# Patient Record
Sex: FEMALE | URBAN - METROPOLITAN AREA
[De-identification: names, ages, dates, MRNs, and addresses within clinical notes are randomized per-mention and may not be internally consistent; named-entity substitution may affect disease eponyms.]

---

## 2022-08-01 ENCOUNTER — APPOINTMENT (RX ONLY)
Dept: URBAN - METROPOLITAN AREA CLINIC 156 | Facility: CLINIC | Age: 27
Setting detail: DERMATOLOGY
End: 2022-08-01

## 2022-08-01 VITALS — WEIGHT: 200 LBS | HEIGHT: 64 IN

## 2022-08-01 DIAGNOSIS — L24 IRRITANT CONTACT DERMATITIS: ICD-10-CM

## 2022-08-01 DIAGNOSIS — L57.8 OTHER SKIN CHANGES DUE TO CHRONIC EXPOSURE TO NONIONIZING RADIATION: ICD-10-CM

## 2022-08-01 PROBLEM — L30.9 DERMATITIS, UNSPECIFIED: Status: ACTIVE | Noted: 2022-08-01

## 2022-08-01 PROCEDURE — ? PRESCRIPTION

## 2022-08-01 PROCEDURE — ? BIOPSY BY SHAVE METHOD

## 2022-08-01 PROCEDURE — 99203 OFFICE O/P NEW LOW 30 MIN: CPT | Mod: 25

## 2022-08-01 PROCEDURE — 11102 TANGNTL BX SKIN SINGLE LES: CPT

## 2022-08-01 PROCEDURE — ? COUNSELING

## 2022-08-01 RX ORDER — NYSTATIN AND TRIAMCINOLONE ACETONIDE 100000; 1 [USP'U]/G; MG/G
CREAM TOPICAL
Qty: 60 | Refills: 0 | Status: ERX | COMMUNITY
Start: 2022-08-01

## 2022-08-01 RX ADMIN — NYSTATIN AND TRIAMCINOLONE ACETONIDE: 100000; 1 CREAM TOPICAL at 00:00

## 2022-08-01 ASSESSMENT — LOCATION SIMPLE DESCRIPTION DERM: LOCATION SIMPLE: RIGHT FOREARM

## 2022-08-01 ASSESSMENT — LOCATION DETAILED DESCRIPTION DERM: LOCATION DETAILED: RIGHT VENTRAL DISTAL FOREARM

## 2022-08-01 ASSESSMENT — LOCATION ZONE DERM: LOCATION ZONE: ARM

## 2022-08-01 NOTE — PROCEDURE: MIPS QUALITY
Detail Level: Detailed
Quality 226: Preventive Care And Screening: Tobacco Use: Screening And Cessation Intervention: Patient screened for tobacco use and is an ex/non-smoker
Quality 111:Pneumonia Vaccination Status For Older Adults: Pneumococcal vaccine administered on or after patient’s 60th birthday and before the end of the measurement period
Quality 431: Preventive Care And Screening: Unhealthy Alcohol Use - Screening: Patient not identified as an unhealthy alcohol user when screened for unhealthy alcohol use using a systematic screening method
Quality 110: Preventive Care And Screening: Influenza Immunization: Influenza Immunization not Administered because Patient Refused.

## 2022-08-29 ENCOUNTER — APPOINTMENT (RX ONLY)
Dept: URBAN - METROPOLITAN AREA CLINIC 157 | Facility: CLINIC | Age: 27
Setting detail: DERMATOLOGY
End: 2022-08-29

## 2022-08-29 VITALS — WEIGHT: 200 LBS | HEIGHT: 64 IN

## 2022-08-29 DIAGNOSIS — L30.0 NUMMULAR DERMATITIS: ICD-10-CM | Status: IMPROVED

## 2022-08-29 PROCEDURE — ? TREATMENT REGIMEN

## 2022-08-29 PROCEDURE — ? COUNSELING

## 2022-08-29 PROCEDURE — ? PRESCRIPTION

## 2022-08-29 PROCEDURE — 99213 OFFICE O/P EST LOW 20 MIN: CPT

## 2022-08-29 RX ORDER — BETAMETHASONE DIPROPIONATE 0.5 MG/G
CREAM TOPICAL
Qty: 45 | Refills: 2 | Status: ERX | COMMUNITY
Start: 2022-08-29

## 2022-08-29 RX ADMIN — BETAMETHASONE DIPROPIONATE: 0.5 CREAM TOPICAL at 00:00

## 2022-08-29 ASSESSMENT — LOCATION ZONE DERM: LOCATION ZONE: ARM

## 2022-08-29 ASSESSMENT — LOCATION SIMPLE DESCRIPTION DERM: LOCATION SIMPLE: RIGHT FOREARM

## 2022-08-29 ASSESSMENT — SEVERITY ASSESSMENT: SEVERITY: ALMOST CLEAR

## 2022-08-29 ASSESSMENT — LOCATION DETAILED DESCRIPTION DERM: LOCATION DETAILED: RIGHT PROXIMAL DORSAL FOREARM

## 2022-08-29 NOTE — PROCEDURE: TREATMENT REGIMEN
Detail Level: Zone
Initiate Treatment: betamethasone dipropionate 0.05 % topical cream \\nApply by topical route to the affected areas on the arm BID for 2 weeks then as needed for flares.

## 2023-07-11 ENCOUNTER — OFFICE VISIT (OUTPATIENT)
Dept: OBSTETRICS AND GYNECOLOGY | Facility: CLINIC | Age: 28
End: 2023-07-11
Payer: COMMERCIAL

## 2023-07-11 ENCOUNTER — LAB VISIT (OUTPATIENT)
Dept: LAB | Facility: HOSPITAL | Age: 28
End: 2023-07-11
Attending: STUDENT IN AN ORGANIZED HEALTH CARE EDUCATION/TRAINING PROGRAM
Payer: COMMERCIAL

## 2023-07-11 ENCOUNTER — HOSPITAL ENCOUNTER (OUTPATIENT)
Dept: RADIOLOGY | Facility: HOSPITAL | Age: 28
Discharge: HOME | End: 2023-07-11
Attending: STUDENT IN AN ORGANIZED HEALTH CARE EDUCATION/TRAINING PROGRAM
Payer: COMMERCIAL

## 2023-07-11 VITALS
HEART RATE: 69 BPM | HEIGHT: 63 IN | BODY MASS INDEX: 23.79 KG/M2 | SYSTOLIC BLOOD PRESSURE: 121 MMHG | DIASTOLIC BLOOD PRESSURE: 62 MMHG | WEIGHT: 134.25 LBS

## 2023-07-11 DIAGNOSIS — N92.6 IRREGULAR MENSES: ICD-10-CM

## 2023-07-11 DIAGNOSIS — Z01.419 WELL WOMAN EXAM WITH ROUTINE GYNECOLOGICAL EXAM: Primary | ICD-10-CM

## 2023-07-11 DIAGNOSIS — Z71.85 HPV VACCINE COUNSELING: ICD-10-CM

## 2023-07-11 DIAGNOSIS — Z01.419 WELL WOMAN EXAM WITH ROUTINE GYNECOLOGICAL EXAM: ICD-10-CM

## 2023-07-11 DIAGNOSIS — Z13.31 POSITIVE DEPRESSION SCREENING: ICD-10-CM

## 2023-07-11 LAB
ALBUMIN SERPL BCP-MCNC: 4.6 G/DL (ref 3.5–5.2)
ALP SERPL-CCNC: 53 U/L (ref 55–135)
ALT SERPL W/O P-5'-P-CCNC: 14 U/L (ref 10–44)
ANION GAP SERPL CALC-SCNC: 13 MMOL/L (ref 8–16)
AST SERPL-CCNC: 20 U/L (ref 10–40)
BASOPHILS # BLD AUTO: 0.05 K/UL (ref 0–0.2)
BASOPHILS NFR BLD: 0.6 % (ref 0–1.9)
BILIRUB SERPL-MCNC: 0.4 MG/DL (ref 0.1–1)
BUN SERPL-MCNC: 13 MG/DL (ref 6–20)
CALCIUM SERPL-MCNC: 9.6 MG/DL (ref 8.7–10.5)
CHLORIDE SERPL-SCNC: 107 MMOL/L (ref 95–110)
CHOLEST SERPL-MCNC: 237 MG/DL (ref 120–199)
CHOLEST/HDLC SERPL: 3.3 {RATIO} (ref 2–5)
CO2 SERPL-SCNC: 21 MMOL/L (ref 23–29)
CREAT SERPL-MCNC: 0.8 MG/DL (ref 0.5–1.4)
DIFFERENTIAL METHOD: ABNORMAL
EOSINOPHIL # BLD AUTO: 0.1 K/UL (ref 0–0.5)
EOSINOPHIL NFR BLD: 1.3 % (ref 0–8)
ERYTHROCYTE [DISTWIDTH] IN BLOOD BY AUTOMATED COUNT: 12 % (ref 11.5–14.5)
EST. GFR  (NO RACE VARIABLE): >60 ML/MIN/1.73 M^2
GLUCOSE SERPL-MCNC: 81 MG/DL (ref 70–110)
HCT VFR BLD AUTO: 41.5 % (ref 37–48.5)
HDLC SERPL-MCNC: 72 MG/DL (ref 40–75)
HDLC SERPL: 30.4 % (ref 20–50)
HGB BLD-MCNC: 14.2 G/DL (ref 12–16)
IMM GRANULOCYTES # BLD AUTO: 0.02 K/UL (ref 0–0.04)
IMM GRANULOCYTES NFR BLD AUTO: 0.2 % (ref 0–0.5)
LDLC SERPL CALC-MCNC: 153.4 MG/DL (ref 63–159)
LYMPHOCYTES # BLD AUTO: 2.8 K/UL (ref 1–4.8)
LYMPHOCYTES NFR BLD: 32.3 % (ref 18–48)
MCH RBC QN AUTO: 31.1 PG (ref 27–31)
MCHC RBC AUTO-ENTMCNC: 34.2 G/DL (ref 32–36)
MCV RBC AUTO: 91 FL (ref 82–98)
MONOCYTES # BLD AUTO: 0.4 K/UL (ref 0.3–1)
MONOCYTES NFR BLD: 4.9 % (ref 4–15)
NEUTROPHILS # BLD AUTO: 5.3 K/UL (ref 1.8–7.7)
NEUTROPHILS NFR BLD: 60.7 % (ref 38–73)
NONHDLC SERPL-MCNC: 165 MG/DL
NRBC BLD-RTO: 0 /100 WBC
PLATELET # BLD AUTO: 316 K/UL (ref 150–450)
PMV BLD AUTO: 10.7 FL (ref 9.2–12.9)
POTASSIUM SERPL-SCNC: 3.7 MMOL/L (ref 3.5–5.1)
PROT SERPL-MCNC: 7.9 G/DL (ref 6–8.4)
RBC # BLD AUTO: 4.57 M/UL (ref 4–5.4)
SODIUM SERPL-SCNC: 141 MMOL/L (ref 136–145)
TRIGL SERPL-MCNC: 58 MG/DL (ref 30–150)
WBC # BLD AUTO: 8.79 K/UL (ref 3.9–12.7)

## 2023-07-11 PROCEDURE — 83002 ASSAY OF GONADOTROPIN (LH): CPT | Performed by: STUDENT IN AN ORGANIZED HEALTH CARE EDUCATION/TRAINING PROGRAM

## 2023-07-11 PROCEDURE — 99385 PREV VISIT NEW AGE 18-39: CPT | Mod: S$GLB,,, | Performed by: STUDENT IN AN ORGANIZED HEALTH CARE EDUCATION/TRAINING PROGRAM

## 2023-07-11 PROCEDURE — 83525 ASSAY OF INSULIN: CPT | Performed by: STUDENT IN AN ORGANIZED HEALTH CARE EDUCATION/TRAINING PROGRAM

## 2023-07-11 PROCEDURE — 80053 COMPREHEN METABOLIC PANEL: CPT | Performed by: STUDENT IN AN ORGANIZED HEALTH CARE EDUCATION/TRAINING PROGRAM

## 2023-07-11 PROCEDURE — 83001 ASSAY OF GONADOTROPIN (FSH): CPT | Performed by: STUDENT IN AN ORGANIZED HEALTH CARE EDUCATION/TRAINING PROGRAM

## 2023-07-11 PROCEDURE — 76856 US EXAM PELVIC COMPLETE: CPT | Mod: TC,PO

## 2023-07-11 PROCEDURE — 84403 ASSAY OF TOTAL TESTOSTERONE: CPT | Performed by: STUDENT IN AN ORGANIZED HEALTH CARE EDUCATION/TRAINING PROGRAM

## 2023-07-11 PROCEDURE — 76830 US PELVIS COMP WITH TRANSVAG NON-OB (XPD): ICD-10-PCS | Mod: 26,,, | Performed by: RADIOLOGY

## 2023-07-11 PROCEDURE — 84270 ASSAY OF SEX HORMONE GLOBUL: CPT | Performed by: STUDENT IN AN ORGANIZED HEALTH CARE EDUCATION/TRAINING PROGRAM

## 2023-07-11 PROCEDURE — 82627 DEHYDROEPIANDROSTERONE: CPT | Performed by: STUDENT IN AN ORGANIZED HEALTH CARE EDUCATION/TRAINING PROGRAM

## 2023-07-11 PROCEDURE — 76856 US PELVIS COMP WITH TRANSVAG NON-OB (XPD): ICD-10-PCS | Mod: 26,,, | Performed by: RADIOLOGY

## 2023-07-11 PROCEDURE — 85025 COMPLETE CBC W/AUTO DIFF WBC: CPT | Performed by: STUDENT IN AN ORGANIZED HEALTH CARE EDUCATION/TRAINING PROGRAM

## 2023-07-11 PROCEDURE — 76856 US EXAM PELVIC COMPLETE: CPT | Mod: 26,,, | Performed by: RADIOLOGY

## 2023-07-11 PROCEDURE — 99999 PR PBB SHADOW E&M-NEW PATIENT-LVL III: CPT | Mod: PBBFAC,,, | Performed by: STUDENT IN AN ORGANIZED HEALTH CARE EDUCATION/TRAINING PROGRAM

## 2023-07-11 PROCEDURE — 83036 HEMOGLOBIN GLYCOSYLATED A1C: CPT | Performed by: STUDENT IN AN ORGANIZED HEALTH CARE EDUCATION/TRAINING PROGRAM

## 2023-07-11 PROCEDURE — 76830 TRANSVAGINAL US NON-OB: CPT | Mod: 26,,, | Performed by: RADIOLOGY

## 2023-07-11 PROCEDURE — 99385 PR PREVENTIVE VISIT,NEW,18-39: ICD-10-PCS | Mod: S$GLB,,, | Performed by: STUDENT IN AN ORGANIZED HEALTH CARE EDUCATION/TRAINING PROGRAM

## 2023-07-11 PROCEDURE — 80061 LIPID PANEL: CPT | Performed by: STUDENT IN AN ORGANIZED HEALTH CARE EDUCATION/TRAINING PROGRAM

## 2023-07-11 PROCEDURE — 99999 PR PBB SHADOW E&M-NEW PATIENT-LVL III: ICD-10-PCS | Mod: PBBFAC,,, | Performed by: STUDENT IN AN ORGANIZED HEALTH CARE EDUCATION/TRAINING PROGRAM

## 2023-07-11 PROCEDURE — 36415 COLL VENOUS BLD VENIPUNCTURE: CPT | Performed by: STUDENT IN AN ORGANIZED HEALTH CARE EDUCATION/TRAINING PROGRAM

## 2023-07-11 NOTE — PROGRESS NOTES
[facility] Note    Subjective:     Chief Complaint: Annual Exam      HPI:  2023    27-year-old female presents as a new patient interested in getting tested for PCOS.  She was previously on birth control pills but stopped these in February.  She recently graduated from [employer] in May.  Since stopping the pills, she reports anxiety, depression, weight gain, hair loss and irregular cycles.  Cycles were previously irregular prior to starting birth control pills.  The pills made cycles more regular.  She also reports more cold sores.  She does report an aunt had PCOS.  Patient does report excessive hair growth.  She and her fiance moved from [place].  Denies any family history of thyroid issues.    She had her well-woman exam in 2022.  That Pap smear was normal.  Denies any history of abnormal Pap smears.      GYN & OB History  Patient's last menstrual period was 2023 (exact date).     Date of Last Pap: No result found    OB History    Para Term  AB Living   0 0 0 0 0 0   SAB IAB Ectopic Multiple Live Births   0 0 0 0 0       History reviewed. No pertinent past medical history.  Past Surgical History:   Procedure Laterality Date    CHOLECYSTECTOMY       Review of patient's allergies indicates:   Allergen Reactions    Wheat containing prod Swelling    Amoxicillin Hives and Rash       Social History     Socioeconomic History    Marital status: Single   Tobacco Use    Smoking status: Never    Smokeless tobacco: Never   Substance and Sexual Activity    Alcohol use: Yes     Comment: rarely    Drug use: Never    Sexual activity: Yes     Partners: Male     Birth control/protection: None       Family History   Problem Relation Age of Onset    Diabetes Maternal Grandfather        Medications  No current outpatient medications on file prior to visit.       Review of Systems   Constitutional: Negative for appetite change, fever and unexpected weight  "change.   Respiratory: Negative for cough and shortness of breath.    Cardiovascular: Negative for chest pain and palpitations.   Gastrointestinal: Negative for abdominal distention, constipation, nausea and vomiting.   Genitourinary: Negative for dyspareunia, dysuria, hematuria and pelvic pain.        GYN ROS per HPI.   Musculoskeletal: Negative for gait problem and myalgias.   Skin: Negative for rash.   Neurological: Negative for dizziness, light-headedness and headaches.   Psychiatric/Behavioral: The patient is not nervous/anxious.      Objective:     /62 (BP Location: Left arm, Patient Position: Sitting, BP Method: Medium (Automatic))   Pulse 69   Ht 5' 3" (1.6 m)   Wt 60.9 kg (134 lb 4.2 oz)   LMP 07/05/2023 (Exact Date)   BMI 23.78 kg/m²     Physical Exam  Exam conducted with a chaperone present.   Constitutional:       General: She is not in acute distress.  HENT:      Head: Normocephalic.   Eyes:      General: No scleral icterus.  Cardiovascular:      Rate and Rhythm: Normal rate and regular rhythm.   Genitourinary:     General: Normal vulva.      Pubic Area: No rash.       Labia:         Right: No rash or tenderness.         Left: No rash or tenderness.       Vagina: No tenderness or bleeding.      Cervix: No cervical motion tenderness.      Uterus: Not tender.       Adnexa:         Right: No mass or tenderness.          Left: No mass or tenderness.     Neurological:      General: No focal deficit present.      Mental Status: She is alert.   Psychiatric:         Mood and Affect: Mood normal.       Assessment:     1. Well woman exam with routine gynecological exam    2. Irregular menses    3. Positive depression screening      Plan:     1. Well woman exam with routine gynecological exam  - Comprehensive Metabolic Panel; Future  - CBC Auto Differential; Future  - Lipid Panel; Future    2. Irregular menses  - TSH; Future  - T4, FREE; Future  - TSH  - T4, FREE  - US Pelvis Comp with Transvag NON-OB " (xpd; Future  - Luteinizing Hormone; Future  - Follicle Stimulating Hormone; Future  - Testosterone Panel; Future  - Hemoglobin A1C; Future  - DHEA-Sulfate; Future  - Insulin, Random; Future    3. Positive depression screening  - Managed by a therapist. Denies any suicidal or homicidal intent. If symptoms worsen, she was instructed to contact her therapist.       Follow up for ASAP after recommended testing is obtained.  Follow-up sooner if needed.   Patient will be notified when labs return and further recommendations will be given at that time.     Based on history, I do suspect that she might have PCOS. We will get labs and pelvic ultrasound to evaluate.   She will follow up to discuss the results.     She does not have a PCP, and she declined a referral to family medicine since she anticipates moving soon. Her fiance is a .     The patient was provided with GARDASIL 9 vaccine information.  The pros, cons, risks, benefits, alternatives and indications of the GARDASIL vaccine were discussed.  We discussed the 0, 2 months, and 6 months dosing.     She has already received the GARDASIL / Human papilloma virus vaccine and therefore does not require it.         Due for annual exam in October.    Paula Arrington PA-C  07/11/2023

## 2023-07-12 ENCOUNTER — PATIENT MESSAGE (OUTPATIENT)
Dept: OBSTETRICS AND GYNECOLOGY | Facility: CLINIC | Age: 28
End: 2023-07-12
Payer: COMMERCIAL

## 2023-07-12 LAB
ESTIMATED AVG GLUCOSE: 103 MG/DL (ref 68–131)
HBA1C MFR BLD: 5.2 % (ref 4.5–6.2)

## 2023-07-13 LAB
DHEA-S SERPL-MCNC: 238 MCG/DL (ref 83–377)
FSH SERPL-ACNC: 5.8 IU/L
INSULIN AB SER QL: 4.4 MCIU/ML (ref 2.6–24.9)
LH SERPL-ACNC: 9.2 IU/L

## 2023-07-18 LAB
ALBUMIN SERPL-MCNC: 4.8 G/DL (ref 3.6–5.1)
SHBG SERPL-SCNC: 96 NMOL/L (ref 17–124)
TESTOST FREE SERPL-MCNC: 2.7 PG/ML (ref 0.2–5)
TESTOST SERPL-MCNC: 58 NG/DL (ref 2–45)
TESTOSTERONE.FREE+WB SERPL-MCNC: 6 NG/DL (ref 0.5–8.5)

## 2023-07-19 NOTE — PROGRESS NOTES
[facility] Note    Subjective:     Chief Complaint: Follow-up (2 week f/u)      HPI:  2023    27-year-old female presents as a new patient interested in getting tested for PCOS.  She was previously on birth control pills but stopped these in February.  She recently graduated from [employer] in May.  Since stopping the pills, she reports anxiety, depression, weight gain, hair loss and irregular cycles.  Cycles were previously irregular prior to starting birth control pills.  The pills made cycles more regular.  She also reports more cold sores.  She does report an aunt had PCOS.  Patient does report excessive hair growth.  She and her fiance moved from [place].  Denies any family history of thyroid issues.    She had her well-woman exam in 2022.  That Pap smear was normal.  Denies any history of abnormal Pap smears.    2023    27-YO female presents as a follow up and was last seen for the above.  She wanted testing for PCOS and presents today to discuss labs and ultrasound results.    She also reports that her anxiety has worsened since stopping the pills. She stopped pills because she heard that estrogen is not good for her gut health and that she developed yeast infections while on OCP. Denies any suicidal or homicidal ideation or fidel. She tried Lexapro (escitalopram) but reports that it did not help her anxiety and caused insomnia. She was on OCP and Lexapro at that same time and did not have any reactions. She denies any history of blood clots, strokes or migraines with aura.     GYN & OB History  Patient's last menstrual period was 2023 (exact date).     Date of Last Pap: No result found    OB History    Para Term  AB Living   0 0 0 0 0 0   SAB IAB Ectopic Multiple Live Births   0 0 0 0 0       History reviewed. No pertinent past medical history.  Past Surgical History:   Procedure Laterality Date    CHOLECYSTECTOMY    "    Review of patient's allergies indicates:   Allergen Reactions    Wheat containing prod Swelling    Amoxicillin Hives and Rash       Social History     Socioeconomic History    Marital status: Single   Tobacco Use    Smoking status: Never    Smokeless tobacco: Never   Substance and Sexual Activity    Alcohol use: Yes     Comment: rarely    Drug use: Never    Sexual activity: Yes     Partners: Male     Birth control/protection: None       Family History   Problem Relation Age of Onset    Diabetes Maternal Grandfather        Medications  Current Outpatient Medications on File Prior to Visit   Medication Sig Dispense Refill Last Dose    valACYclovir (VALTREX) 1000 MG tablet    Taking       Review of Systems   Constitutional: Negative for appetite change, fever and unexpected weight change.   Respiratory: Negative for cough and shortness of breath.    Cardiovascular: Negative for chest pain and palpitations.   Gastrointestinal: Negative for abdominal distention, constipation, nausea and vomiting.   Genitourinary: Negative for dyspareunia, dysuria, hematuria and pelvic pain.        GYN ROS per HPI.   Musculoskeletal: Negative for gait problem and myalgias.   Skin: Negative for rash.   Neurological: Negative for dizziness, light-headedness and headaches.   Psychiatric/Behavioral: The patient is not nervous/anxious.      Objective:     /68 (BP Location: Left arm, Patient Position: Sitting, BP Method: Medium (Automatic))   Pulse 65   Ht 5' 3" (1.6 m)   Wt 68.2 kg (150 lb 5.7 oz)   LMP 07/05/2023 (Exact Date)   BMI 26.63 kg/m²     Physical Exam  Constitutional:       General: She is not in acute distress.  HENT:      Head: Normocephalic.   Pulmonary:      Effort: Pulmonary effort is normal. No respiratory distress.   Neurological:      General: No focal deficit present.      Mental Status: She is alert.   Psychiatric:         Mood and Affect: Mood normal.         Behavior: Behavior normal.     Recent lab " results:    Follicle Stimulating Hormone   Date Value Ref Range Status   07/11/2023 5.8 IU/L Final     Comment:     -------------------REFERENCE VALUE--------------------------  Premenopausal:  2.9-14.6 IU/L (Follicular)  4.7-23.2 IU/L (Midcycle)  1.4-8.9 IU/L (Luteal)  Postmenopausal:  16.0-157.0 IU/L    Test Performed by:  [place] [address]  : Nato Carrillo M.D. Ph.D.; CLIA# [id number]       LH   Date Value Ref Range Status   07/11/2023 9.2 IU/L Final     Comment:     -------------------REFERENCE VALUE--------------------------  Premenopausal:  1.9-14.6 IU/L (Follicular)  12.2-118.0 IU/L (Midcycle)  0.7-12.9 IU/L (Luteal)  Postmenopausal:  5.3-65.4 IU/L    Test Performed by:  [place] [address]  : Nato Carrillo M.D. Ph.D.; CLIA# [id number]       Testosterone, Free   Date Value Ref Range Status   07/11/2023 2.7 0.2 - 5.0 pg/mL Final     Testosterone   Date Value Ref Range Status   07/11/2023 58 (H) 2 - 45 ng/dL Final     Comment:     For additional information, please refer to   http://[email or web address].Hyperpot/faq/TotalTestosteroneLCMSMS  (This link is being provided for informational/  educational purposes only.)    This test was developed and its analytical performance   characteristics have been determined by [facility]. It has not been cleared or approved by the  FDA. This assay has been validated pursuant to the CLIA   regulations and is used for clinical purposes.    TEST PERFORMED AT:  [facility] [address] [address]  RADHA DAMON MD       DHEA-SO4   Date Value Ref Range Status   07/11/2023 238 83 - 377 mcg/dL Final     Comment:     Test Performed by:  [facility] - [address] [address]  : Nato  ZOË Carrillo M.D. Ph.D.; CLIA# [id number]       Insulin   Date Value Ref Range Status   07/11/2023 4.4 2.6 - 24.9 mcIU/mL Final     Comment:     Test Performed by:  [facility]  [address] [place] [address]  : Nato Carrillo M.D. Ph.D.; CLIA# [id number]         Recent imaging results:    Results for orders placed during the hospital encounter of 07/11/23    US Pelvis Comp with Transvag NON-OB (xpd    Narrative  EXAMINATION:  US PELVIS COMP WITH TRANSVAG NON-OB (XPD)    CLINICAL HISTORY:  Irregular menstruation, unspecified    TECHNIQUE:  Transabdominal sonography of the pelvis was performed, followed by transvaginal sonography to better evaluate the uterus and ovaries.    COMPARISON:  None.    FINDINGS:  Uterus:    Size: 7.4 x 3.4 x 3.8 cm    Masses: None small nabothian cysts noted    Endometrium: Normal in this pre menopausal patient, measuring 5 mm.    Right ovary:    Size: 3 x 2.3 x 4.6 cm volume 16.2 cc.    Appearance: There is a 1 cm cyst, and there are numerous 2-8 mm cyst.    Vascular flow: Normal.    Left ovary:    Size: 3.7 x 3.4 x 2.4 cm volume 16 cc    Appearance: Normal numerous 2-8 mm cyst    Vascular Flow: Normal.    Free Fluid:    None.    Impression  Findings suggesting polycystic ovary syndrome      Electronically signed by: Randi Peña MD  Date:    07/11/2023  Time:    16:06      Assessment:     1. PCOS (polycystic ovarian syndrome)    2. Anxiety    3. History of candidal vulvovaginitis      Plan:     1. PCOS (polycystic ovarian syndrome)  - norethindrone-ethinyl estradiol (MICROGESTIN 1/20) 1-20 mg-mcg per tablet; Take 1 tablet by mouth once daily.  Dispense: 90 tablet; Refill: 3    2. Anxiety  - sertraline (ZOLOFT) 25 MG tablet; Take 1 tablet (25 mg total) by mouth once daily.  Dispense: 90 tablet; Refill: 0    3. History of candidal vulvovaginitis  - fluconazole (DIFLUCAN) 150 MG Tab; Take 1 tablet (150 mg total) by mouth  once daily.  Dispense: 1 tablet; Refill: 2      Follow up in about 3 months (around 10/25/2023).  Follow-up sooner if needed.     Thyroid labs were not drawn. At a later date, she might go get them drawn since she was interested in getting those levels. We do not expect this lab to influence our treatment at this time assuming labs are normal. Denies any thyroid issues. Lab orders are in the system.    She meets the criteria of PCOS (oligo ovulation/anovulation and clinical/biochemical signs of hyperandrogenism and polycystic ovaries).  We discussed her lab and imaging results as listed above.   Discussed treatment options which included OCP, birth control patches, vaginal ring, progestin only pills, subdermal progesterone implant, progesterone injection and progesterone only IUDs.     She elects to start OCP.     Discussed PCOS and its effects on the body.     The pros, cons, risks, benefits, alternatives and indications of the medication(s) prescribed, as well as appropriate use discussed.  She was instructed to start the OCP on Sunday.      - Discussed polycystic ovarian syndrome (PCOS) is a disorder characterized by hyperandrogenism, ovulatory dysfunction, and polycystic appearing ovaries. Discussed increased risk for metabolic syndrome, risk of DM and cardiovascular disease.   - Discussed weight loss can help lowering circulating levels of androgen and help spontaneous ovulation/resumption of menses. Even a 5% reduction in weight has been shown to help. Intermittent fasting proving to be good at lowering insulin levels. Discussed lifestyle modification including diet and exercise.   - Discussed metformin if studies showing insulin resistance. Discussed certain medications can help with hirsutism if indicated for patient.   - Discussed treatment depends on end goal including regular menstrual cycles vs fertility help. Usually OCPs are a first line therapy to maintain level of shedding lining of the uterus. Can  also do cyclic progesterone as to not interfere with pregnancy. If trying to get pregnant may require OI or MAXIMINO help.   - Discussed need for regular cycles as PCOS can increase EIN or endometrial cancer.     We will also start medication for anxiety. If symptoms worsen or she develops any suicidal or homicidal ideation, she was instructed to stop the medication immediately and contact the clinic.   Sent a dose of diflucan if she develops yeast infections on OCP.        Due for annual exam in October.    Paula Arrington PA-C  07/25/2023

## 2023-07-25 ENCOUNTER — OFFICE VISIT (OUTPATIENT)
Dept: OBSTETRICS AND GYNECOLOGY | Facility: CLINIC | Age: 28
End: 2023-07-25
Payer: COMMERCIAL

## 2023-07-25 ENCOUNTER — TELEPHONE (OUTPATIENT)
Dept: OBSTETRICS AND GYNECOLOGY | Facility: CLINIC | Age: 28
End: 2023-07-25

## 2023-07-25 VITALS
BODY MASS INDEX: 26.64 KG/M2 | WEIGHT: 150.38 LBS | HEART RATE: 65 BPM | SYSTOLIC BLOOD PRESSURE: 112 MMHG | DIASTOLIC BLOOD PRESSURE: 68 MMHG | HEIGHT: 63 IN

## 2023-07-25 DIAGNOSIS — F41.9 ANXIETY: ICD-10-CM

## 2023-07-25 DIAGNOSIS — E28.2 PCOS (POLYCYSTIC OVARIAN SYNDROME): Primary | ICD-10-CM

## 2023-07-25 DIAGNOSIS — Z86.19 HISTORY OF CANDIDAL VULVOVAGINITIS: ICD-10-CM

## 2023-07-25 PROCEDURE — 99213 OFFICE O/P EST LOW 20 MIN: CPT | Mod: S$GLB,,, | Performed by: STUDENT IN AN ORGANIZED HEALTH CARE EDUCATION/TRAINING PROGRAM

## 2023-07-25 PROCEDURE — 99999 PR PBB SHADOW E&M-EST. PATIENT-LVL III: CPT | Mod: PBBFAC,,, | Performed by: STUDENT IN AN ORGANIZED HEALTH CARE EDUCATION/TRAINING PROGRAM

## 2023-07-25 PROCEDURE — 99999 PR PBB SHADOW E&M-EST. PATIENT-LVL III: ICD-10-PCS | Mod: PBBFAC,,, | Performed by: STUDENT IN AN ORGANIZED HEALTH CARE EDUCATION/TRAINING PROGRAM

## 2023-07-25 PROCEDURE — 99213 PR OFFICE/OUTPT VISIT, EST, LEVL III, 20-29 MIN: ICD-10-PCS | Mod: S$GLB,,, | Performed by: STUDENT IN AN ORGANIZED HEALTH CARE EDUCATION/TRAINING PROGRAM

## 2023-07-25 RX ORDER — VALACYCLOVIR HYDROCHLORIDE 1 G/1
TABLET, FILM COATED ORAL
COMMUNITY
Start: 2023-06-10

## 2023-07-25 RX ORDER — FLUCONAZOLE 150 MG/1
150 TABLET ORAL DAILY
Qty: 1 TABLET | Refills: 2 | Status: SHIPPED | OUTPATIENT
Start: 2023-07-25

## 2023-07-25 RX ORDER — NORETHINDRONE ACETATE AND ETHINYL ESTRADIOL .02; 1 MG/1; MG/1
1 TABLET ORAL DAILY
Qty: 90 TABLET | Refills: 3 | Status: SHIPPED | OUTPATIENT
Start: 2023-07-25 | End: 2024-07-24

## 2023-07-25 RX ORDER — SERTRALINE HYDROCHLORIDE 25 MG/1
25 TABLET, FILM COATED ORAL DAILY
Qty: 90 TABLET | Refills: 0 | Status: SHIPPED | OUTPATIENT
Start: 2023-07-25 | End: 2023-10-23

## 2023-07-25 NOTE — TELEPHONE ENCOUNTER
Contacted pharmacy and confirmed with Brody they have Rx.  Attempted to contact pt to notified with no answer and no voicemail available.

## 2023-07-25 NOTE — TELEPHONE ENCOUNTER
----- Message from Raiza Negrete sent at 7/25/2023 11:44 AM CDT -----  Type:  RX Refill Request    Who Called:  pt   Refill or New Rx:  refill   RX Name and Strength:  sertraline (ZOLOFT) 25 MG tablet  norethindrone-ethinyl estradiol (MICROGESTIN 1/20) 1-20 mg-mcg per tablet  How is the patient currently taking it? (ex. 1XDay):  as directed   Is this a 30 day or 90 day RX:  90  Preferred Pharmacy with phone number:    [id number] [address] [address]  Phone: [phone] Fax: [phone]     Local or Mail Order:  local   Ordering Provider:  gene Nicole Call Back Number:  [phone] (home)     Additional Information:  requesting this rx be sent to the pharmacy again , pharmacy said they didn't get it.. requesting a call back once it sent  please advise thank you

## 2023-07-31 ENCOUNTER — TELEPHONE (OUTPATIENT)
Dept: OBSTETRICS AND GYNECOLOGY | Facility: CLINIC | Age: 28
End: 2023-07-31
Payer: COMMERCIAL

## 2023-07-31 DIAGNOSIS — N92.6 IRREGULAR MENSES: Primary | ICD-10-CM

## 2023-07-31 NOTE — TELEPHONE ENCOUNTER
----- Message from Venessa Pitts sent at 7/31/2023  8:14 AM CDT -----  Type:  Lab Request    Who Called: Pt    Would the patient rather a call back or a response via [facility]? yes    Best Call Back Number: [phone]    Additional Information: Pt is calling office to schedule labs for thyroid check but there is no orders in. Please call back to advise. Thanks!

## 2023-09-21 ENCOUNTER — APPOINTMENT (RX ONLY)
Dept: URBAN - METROPOLITAN AREA CLINIC 105 | Facility: CLINIC | Age: 28
Setting detail: DERMATOLOGY
End: 2023-09-21

## 2023-09-21 DIAGNOSIS — L30.1 DYSHIDROSIS [POMPHOLYX]: ICD-10-CM

## 2023-09-21 PROCEDURE — 99203 OFFICE O/P NEW LOW 30 MIN: CPT

## 2023-09-21 PROCEDURE — ? COUNSELING

## 2023-09-21 PROCEDURE — ? PRESCRIPTION

## 2023-09-21 RX ORDER — TRIAMCINOLONE ACETONIDE 1 MG/G
CREAM TOPICAL
Qty: 80 | Refills: 2 | Status: ERX | COMMUNITY
Start: 2023-09-21

## 2023-09-21 RX ORDER — TACROLIMUS 1 MG/G
OINTMENT TOPICAL
Qty: 60 | Refills: 2 | Status: ERX | COMMUNITY
Start: 2023-09-21

## 2023-09-21 RX ADMIN — TACROLIMUS: 1 OINTMENT TOPICAL at 00:00

## 2023-09-21 RX ADMIN — TRIAMCINOLONE ACETONIDE: 1 CREAM TOPICAL at 00:00

## 2023-09-21 ASSESSMENT — LOCATION DETAILED DESCRIPTION DERM
LOCATION DETAILED: RIGHT DISTAL PALMAR SMALL FINGER
LOCATION DETAILED: RIGHT VENTRAL DISTAL FOREARM

## 2023-09-21 ASSESSMENT — LOCATION SIMPLE DESCRIPTION DERM
LOCATION SIMPLE: RIGHT SMALL FINGER
LOCATION SIMPLE: RIGHT FOREARM

## 2023-09-21 ASSESSMENT — LOCATION ZONE DERM
LOCATION ZONE: FINGER
LOCATION ZONE: ARM